# Patient Record
Sex: FEMALE | Race: WHITE
[De-identification: names, ages, dates, MRNs, and addresses within clinical notes are randomized per-mention and may not be internally consistent; named-entity substitution may affect disease eponyms.]

---

## 2017-04-28 NOTE — ED
General Adult HPI





- General


Chief complaint: Syncope


Stated complaint: Fall, head injury


Time Seen by Provider: 04/28/17 07:28


Source: patient, RN notes reviewed, old records reviewed


Mode of arrival: wheelchair


Limitations: no limitations





- History of Present Illness


Initial comments: 





This is a 31-year-old female ER for evaluation of syncopal event.  Patient has 

been fall after passing out.  Patient has history of syncope, denies any 

complaints at this time, no headache no chest finishers of breath about pain, 

no injuries from fall.  Patient states she is feeling fine.  Does not smoke, is 

not on birth control.  Has no abdominal pain.  Again is no chest pain.  She has 

syncopal event while getting ready for work this morning, has never had this 

issue before, did collapse but has no injury from fall.  Patient's at this time 

she feels fine





- Related Data


 Home Medications











 Medication  Instructions  Recorded  Confirmed


 


buPROPion SR [Wellbutrin Sr] 150 mg PO BID 04/28/17 04/28/17


 


traMADol HCl [Ultram] 50 mg PO BID 04/28/17 04/28/17











 Allergies











Allergy/AdvReac Type Severity Reaction Status Date / Time


 


No Known Allergies Allergy   Verified 04/28/17 07:25














Review of Systems


ROS Statement: 


Those systems with pertinent positive or pertinent negative responses have been 

documented in the HPI.





ROS Other: All systems not noted in ROS Statement are negative.





Past Medical History


Past Medical History: No Reported History


History of Any Multi-Drug Resistant Organisms: None Reported


Past Surgical History: No Surgical Hx Reported


Past Anesthesia/Blood Transfusion Reactions: No Reported Reaction


Past Psychological History: Anxiety, Depression


Smoking Status: Current every day smoker


Past Alcohol Use History: None Reported


Past Drug Use History: None Reported





- Past Family History


  ** Mother


Family Medical History: No Reported History





General Exam


Limitations: no limitations


General appearance: alert, in no apparent distress


Head exam: Present: atraumatic, normocephalic, normal inspection


Eye exam: Present: normal appearance, PERRL, EOMI.  Absent: scleral icterus, 

conjunctival injection, periorbital swelling


ENT exam: Present: normal exam, mucous membranes moist


Neck exam: Present: normal inspection.  Absent: tenderness, meningismus, 

lymphadenopathy


Respiratory exam: Present: normal lung sounds bilaterally.  Absent: respiratory 

distress, wheezes, rales, rhonchi, stridor


Cardiovascular Exam: Present: regular rate, normal rhythm, normal heart sounds.

  Absent: systolic murmur, diastolic murmur, rubs, gallop, clicks


GI/Abdominal exam: Present: soft, normal bowel sounds.  Absent: distended, 

tenderness, guarding, rebound, rigid


Extremities exam: Present: normal inspection, full ROM, normal capillary 

refill.  Absent: tenderness, pedal edema, joint swelling, calf tenderness


Back exam: Present: normal inspection


Neurological exam: Present: alert, oriented X3, CN II-XII intact


Psychiatric exam: Present: normal affect, normal mood


Skin exam: Present: warm, dry, intact, normal color.  Absent: rash





Course


 Vital Signs











  04/28/17 04/28/17





  07:20 08:41


 


Temperature 97.4 F L 98.1 F


 


Pulse Rate 116 H 103 H


 


Respiratory 16 18





Rate  


 


Blood Pressure 132/87 127/64


 


O2 Sat by Pulse 97 99





Oximetry  














EKG Findings





- EKG Comments:


EKG Findings:: EKG shows sinus tachycardia rate 104, , QRS 84, 





Medical Decision Making





- Medical Decision Making





31 female to the ER for evaluation of syncope, patient patient has recurrent 

syncope, at this time has no complaints.  Patient states she feels fine and 

would like to be discharged home





- Lab Data


 Lab Results











  04/28/17 04/28/17 04/28/17 Range/Units





  07:33 07:48 07:48 


 


POC Glucose (mg/dL)  89    (75-99)  mg/dL


 


POC Glu Operater ID  Tomas Ventura    


 


Urine Color   Yellow   


 


Urine Appearance   Clear   (Clear)  


 


Urine pH   5.5   (5.0-8.0)  


 


Ur Specific Gravity   1.012   (1.001-1.035)  


 


Urine Protein   Negative   (Negative)  


 


Urine Glucose (UA)   Negative   (Negative)  


 


Urine Ketones   Negative   (Negative)  


 


Urine Blood   Small H   (Negative)  


 


Urine Nitrite   Negative   (Negative)  


 


Urine Bilirubin   Negative   (Negative)  


 


Urine Urobilinogen   <2.0   (<2.0)  mg/dL


 


Ur Leukocyte Esterase   Negative   (Negative)  


 


Urine RBC   1   (0-5)  /hpf


 


Urine WBC   3   (0-5)  /hpf


 


Ur Squamous Epith Cells   <1   (0-4)  /hpf


 


Hyaline Casts   13 H   (0-2)  /lpf


 


Granular Casts   33   (0)  /lpf


 


Urine Mucus   Rare H   (None)  /hpf


 


Urine HCG, Qual    Not Detected  (Not Detectd)  














Disposition


Clinical Impression: 


 Vasovagal syncope





Disposition: HOME SELF-CARE


Condition: Good


Instructions:  Syncope (ED)


Referrals: 


Nena Raymundo MD [Primary Care Provider] - 1-2 days

## 2018-05-18 ENCOUNTER — HOSPITAL ENCOUNTER (OUTPATIENT)
Dept: HOSPITAL 47 - EC | Age: 33
Setting detail: OBSERVATION
LOS: 2 days | Discharge: HOME | End: 2018-05-20
Payer: COMMERCIAL

## 2018-05-18 VITALS — BODY MASS INDEX: 32.2 KG/M2

## 2018-05-18 DIAGNOSIS — R55: Primary | ICD-10-CM

## 2018-05-18 DIAGNOSIS — S09.90XA: ICD-10-CM

## 2018-05-18 DIAGNOSIS — F15.10: ICD-10-CM

## 2018-05-18 DIAGNOSIS — F17.200: ICD-10-CM

## 2018-05-18 DIAGNOSIS — S30.0XXA: ICD-10-CM

## 2018-05-18 DIAGNOSIS — M41.9: ICD-10-CM

## 2018-05-18 DIAGNOSIS — N39.0: ICD-10-CM
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
## 2018-05-18 DIAGNOSIS — T42.6X1A: ICD-10-CM

## 2018-05-18 DIAGNOSIS — E66.9: ICD-10-CM
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
## 2018-05-18 DIAGNOSIS — F41.9: ICD-10-CM

## 2018-05-18 DIAGNOSIS — S00.531A: ICD-10-CM

## 2018-05-18 DIAGNOSIS — F11.10: ICD-10-CM

## 2018-05-18 DIAGNOSIS — R00.0: ICD-10-CM

## 2018-05-18 DIAGNOSIS — R56.9: ICD-10-CM

## 2018-05-18 DIAGNOSIS — F32.9: ICD-10-CM

## 2018-05-18 LAB
ALBUMIN SERPL-MCNC: 3.8 G/DL (ref 3.5–5)
ALP SERPL-CCNC: 66 U/L (ref 38–126)
ALT SERPL-CCNC: 39 U/L (ref 9–52)
ANION GAP SERPL CALC-SCNC: 14 MMOL/L
APTT BLD: 23 SEC (ref 22–30)
AST SERPL-CCNC: 23 U/L (ref 14–36)
BASOPHILS # BLD AUTO: 0 K/UL (ref 0–0.2)
BASOPHILS NFR BLD AUTO: 0 %
BUN SERPL-SCNC: 11 MG/DL (ref 7–17)
CALCIUM SPEC-MCNC: 9.4 MG/DL (ref 8.4–10.2)
CHLORIDE SERPL-SCNC: 104 MMOL/L (ref 98–107)
CK SERPL-CCNC: 109 U/L (ref 30–135)
CO2 SERPL-SCNC: 24 MMOL/L (ref 22–30)
EOSINOPHIL # BLD AUTO: 0.2 K/UL (ref 0–0.7)
EOSINOPHIL NFR BLD AUTO: 4 %
ERYTHROCYTE [DISTWIDTH] IN BLOOD BY AUTOMATED COUNT: 3.84 M/UL (ref 3.8–5.4)
ERYTHROCYTE [DISTWIDTH] IN BLOOD: 12.8 % (ref 11.5–15.5)
GLUCOSE SERPL-MCNC: 89 MG/DL (ref 74–99)
HCT VFR BLD AUTO: 38.3 % (ref 34–46)
HGB BLD-MCNC: 12.6 GM/DL (ref 11.4–16)
HYALINE CASTS UR QL AUTO: 33 /LPF (ref 0–2)
INR PPP: 1 (ref ?–1.2)
LYMPHOCYTES # SPEC AUTO: 0.9 K/UL (ref 1–4.8)
LYMPHOCYTES NFR SPEC AUTO: 14 %
MCH RBC QN AUTO: 32.7 PG (ref 25–35)
MCHC RBC AUTO-ENTMCNC: 32.8 G/DL (ref 31–37)
MCV RBC AUTO: 99.9 FL (ref 80–100)
MONOCYTES # BLD AUTO: 0.4 K/UL (ref 0–1)
MONOCYTES NFR BLD AUTO: 6 %
NEUTROPHILS # BLD AUTO: 4.8 K/UL (ref 1.3–7.7)
NEUTROPHILS NFR BLD AUTO: 72 %
PH UR: 5.5 [PH] (ref 5–8)
PLATELET # BLD AUTO: 288 K/UL (ref 150–450)
POTASSIUM SERPL-SCNC: 4.3 MMOL/L (ref 3.5–5.1)
PROT SERPL-MCNC: 6.4 G/DL (ref 6.3–8.2)
PROT UR QL: (no result)
PT BLD: 10 SEC (ref 9–12)
RBC UR QL: 1 /HPF (ref 0–5)
SODIUM SERPL-SCNC: 142 MMOL/L (ref 137–145)
SP GR UR: 1.02 (ref 1–1.03)
SQUAMOUS UR QL AUTO: <1 /HPF (ref 0–4)
TROPONIN I SERPL-MCNC: <0.012 NG/ML (ref 0–0.03)
UROBILINOGEN UR QL STRIP: 2 MG/DL (ref ?–2)
WBC # BLD AUTO: 6.6 K/UL (ref 3.8–10.6)
WBC #/AREA URNS HPF: 14 /HPF (ref 0–5)

## 2018-05-18 PROCEDURE — 80306 DRUG TEST PRSMV INSTRMNT: CPT

## 2018-05-18 PROCEDURE — 85025 COMPLETE CBC W/AUTO DIFF WBC: CPT

## 2018-05-18 PROCEDURE — 85730 THROMBOPLASTIN TIME PARTIAL: CPT

## 2018-05-18 PROCEDURE — 80053 COMPREHEN METABOLIC PANEL: CPT

## 2018-05-18 PROCEDURE — 96360 HYDRATION IV INFUSION INIT: CPT

## 2018-05-18 PROCEDURE — 81001 URINALYSIS AUTO W/SCOPE: CPT

## 2018-05-18 PROCEDURE — 93005 ELECTROCARDIOGRAM TRACING: CPT

## 2018-05-18 PROCEDURE — 96372 THER/PROPH/DIAG INJ SC/IM: CPT

## 2018-05-18 PROCEDURE — 84484 ASSAY OF TROPONIN QUANT: CPT

## 2018-05-18 PROCEDURE — 70450 CT HEAD/BRAIN W/O DYE: CPT

## 2018-05-18 PROCEDURE — 72220 X-RAY EXAM SACRUM TAILBONE: CPT

## 2018-05-18 PROCEDURE — 82550 ASSAY OF CK (CPK): CPT

## 2018-05-18 PROCEDURE — 84703 CHORIONIC GONADOTROPIN ASSAY: CPT

## 2018-05-18 PROCEDURE — 96375 TX/PRO/DX INJ NEW DRUG ADDON: CPT

## 2018-05-18 PROCEDURE — 82553 CREATINE MB FRACTION: CPT

## 2018-05-18 PROCEDURE — 96376 TX/PRO/DX INJ SAME DRUG ADON: CPT

## 2018-05-18 PROCEDURE — 36415 COLL VENOUS BLD VENIPUNCTURE: CPT

## 2018-05-18 PROCEDURE — 80320 DRUG SCREEN QUANTALCOHOLS: CPT

## 2018-05-18 PROCEDURE — 96361 HYDRATE IV INFUSION ADD-ON: CPT

## 2018-05-18 PROCEDURE — 99285 EMERGENCY DEPT VISIT HI MDM: CPT

## 2018-05-18 PROCEDURE — 85610 PROTHROMBIN TIME: CPT

## 2018-05-18 PROCEDURE — 96374 THER/PROPH/DIAG INJ IV PUSH: CPT

## 2018-05-18 PROCEDURE — 94760 N-INVAS EAR/PLS OXIMETRY 1: CPT

## 2018-05-18 RX ADMIN — CEFAZOLIN STA: 330 INJECTION, POWDER, FOR SOLUTION INTRAMUSCULAR; INTRAVENOUS at 20:16

## 2018-05-18 RX ADMIN — CEFAZOLIN STA MLS/HR: 330 INJECTION, POWDER, FOR SOLUTION INTRAMUSCULAR; INTRAVENOUS at 21:02

## 2018-05-18 NOTE — ED
Medical Decision Making





- Medical Decision Making





Prior to discharge, the patient had approximately 2 seizures well nourished.  

The second one was witnessed by the nurse and was tonic-clonic in nature and 

lasted approximately 30 seconds.  She denies ever having any previous seizures.

  At this point, it is felt as though she would benefit from admission to the 

hospital for further treatment.  She is agreeable.  The case will be discussed 

with internal medicine shortly and neurology consult will be placed.  She will 

also be started on some Keppra.





- Lab Data


Result diagrams: 


 05/18/18 17:15





 05/18/18 17:15


 Lab Results











  05/18/18 05/18/18 05/18/18 Range/Units





  17:15 17:15 17:15 


 


WBC    6.6  (3.8-10.6)  k/uL


 


RBC    3.84  (3.80-5.40)  m/uL


 


Hgb    12.6  (11.4-16.0)  gm/dL


 


Hct    38.3  (34.0-46.0)  %


 


MCV    99.9  (80.0-100.0)  fL


 


MCH    32.7  (25.0-35.0)  pg


 


MCHC    32.8  (31.0-37.0)  g/dL


 


RDW    12.8  (11.5-15.5)  %


 


Plt Count    288  (150-450)  k/uL


 


Neutrophils %    72  %


 


Lymphocytes %    14  %


 


Monocytes %    6  %


 


Eosinophils %    4  %


 


Basophils %    0  %


 


Neutrophils #    4.8  (1.3-7.7)  k/uL


 


Lymphocytes #    0.9 L  (1.0-4.8)  k/uL


 


Monocytes #    0.4  (0-1.0)  k/uL


 


Eosinophils #    0.2  (0-0.7)  k/uL


 


Basophils #    0.0  (0-0.2)  k/uL


 


PT     (9.0-12.0)  sec


 


INR     (<1.2)  


 


APTT     (22.0-30.0)  sec


 


Sodium  142    (137-145)  mmol/L


 


Potassium  4.3    (3.5-5.1)  mmol/L


 


Chloride  104    ()  mmol/L


 


Carbon Dioxide  24    (22-30)  mmol/L


 


Anion Gap  14    mmol/L


 


BUN  11    (7-17)  mg/dL


 


Creatinine  0.70    (0.52-1.04)  mg/dL


 


Est GFR (CKD-EPI)AfAm  >90    (>60 ml/min/1.73 sqM)  


 


Est GFR (CKD-EPI)NonAf  >90    (>60 ml/min/1.73 sqM)  


 


Glucose  89    (74-99)  mg/dL


 


Calcium  9.4    (8.4-10.2)  mg/dL


 


Total Bilirubin  0.2    (0.2-1.3)  mg/dL


 


AST  23    (14-36)  U/L


 


ALT  39    (9-52)  U/L


 


Alkaline Phosphatase  66    ()  U/L


 


Total Creatine Kinase   109   ()  U/L


 


CK-MB (CK-2)   0.5   (0.0-2.4)  ng/mL


 


CK-MB (CK-2) Rel Index   0.5   


 


Troponin I   <0.012   (0.000-0.034)  ng/mL


 


Total Protein  6.4    (6.3-8.2)  g/dL


 


Albumin  3.8    (3.5-5.0)  g/dL


 


HCG, Qual  Not Detected    


 


Urine Color     


 


Urine Appearance     (Clear)  


 


Urine pH     (5.0-8.0)  


 


Ur Specific Gravity     (1.001-1.035)  


 


Urine Protein     (Negative)  


 


Urine Glucose (UA)     (Negative)  


 


Urine Ketones     (Negative)  


 


Urine Blood     (Negative)  


 


Urine Nitrite     (Negative)  


 


Urine Bilirubin     (Negative)  


 


Urine Urobilinogen     (<2.0)  mg/dL


 


Ur Leukocyte Esterase     (Negative)  


 


Urine RBC     (0-5)  /hpf


 


Urine WBC     (0-5)  /hpf


 


Ur Squamous Epith Cells     (0-4)  /hpf


 


Amorphous Sediment     (None)  /hpf


 


Hyaline Casts     (0-2)  /lpf


 


Urine Mucus     (None)  /hpf


 


Urine Opiates Screen     (NotDetected)  


 


Ur Oxycodone Screen     (NotDetected)  


 


Urine Methadone Screen     (NotDetected)  


 


Ur Propoxyphene Screen     (NotDetected)  


 


Ur Barbiturates Screen     (NotDetected)  


 


U Tricyclic Antidepress     (NotDetected)  


 


Ur Phencyclidine Scrn     (NotDetected)  


 


Ur Amphetamines Screen     (NotDetected)  


 


U Methamphetamines Scrn     (NotDetected)  


 


U Benzodiazepines Scrn     (NotDetected)  


 


Urine Cocaine Screen     (NotDetected)  


 


U Marijuana (THC) Screen     (NotDetected)  


 


Serum Alcohol  <10    mg/dL














  05/18/18 05/18/18 05/18/18 Range/Units





  17:15 17:15 17:15 


 


WBC     (3.8-10.6)  k/uL


 


RBC     (3.80-5.40)  m/uL


 


Hgb     (11.4-16.0)  gm/dL


 


Hct     (34.0-46.0)  %


 


MCV     (80.0-100.0)  fL


 


MCH     (25.0-35.0)  pg


 


MCHC     (31.0-37.0)  g/dL


 


RDW     (11.5-15.5)  %


 


Plt Count     (150-450)  k/uL


 


Neutrophils %     %


 


Lymphocytes %     %


 


Monocytes %     %


 


Eosinophils %     %


 


Basophils %     %


 


Neutrophils #     (1.3-7.7)  k/uL


 


Lymphocytes #     (1.0-4.8)  k/uL


 


Monocytes #     (0-1.0)  k/uL


 


Eosinophils #     (0-0.7)  k/uL


 


Basophils #     (0-0.2)  k/uL


 


PT  10.0    (9.0-12.0)  sec


 


INR  1.0    (<1.2)  


 


APTT  23.0    (22.0-30.0)  sec


 


Sodium     (137-145)  mmol/L


 


Potassium     (3.5-5.1)  mmol/L


 


Chloride     ()  mmol/L


 


Carbon Dioxide     (22-30)  mmol/L


 


Anion Gap     mmol/L


 


BUN     (7-17)  mg/dL


 


Creatinine     (0.52-1.04)  mg/dL


 


Est GFR (CKD-EPI)AfAm     (>60 ml/min/1.73 sqM)  


 


Est GFR (CKD-EPI)NonAf     (>60 ml/min/1.73 sqM)  


 


Glucose     (74-99)  mg/dL


 


Calcium     (8.4-10.2)  mg/dL


 


Total Bilirubin     (0.2-1.3)  mg/dL


 


AST     (14-36)  U/L


 


ALT     (9-52)  U/L


 


Alkaline Phosphatase     ()  U/L


 


Total Creatine Kinase     ()  U/L


 


CK-MB (CK-2)     (0.0-2.4)  ng/mL


 


CK-MB (CK-2) Rel Index     


 


Troponin I     (0.000-0.034)  ng/mL


 


Total Protein     (6.3-8.2)  g/dL


 


Albumin     (3.5-5.0)  g/dL


 


HCG, Qual     


 


Urine Color   Yellow   


 


Urine Appearance   Clear   (Clear)  


 


Urine pH   5.5   (5.0-8.0)  


 


Ur Specific Gravity   1.024   (1.001-1.035)  


 


Urine Protein   1+ H   (Negative)  


 


Urine Glucose (UA)   Negative   (Negative)  


 


Urine Ketones   Trace H   (Negative)  


 


Urine Blood   Negative   (Negative)  


 


Urine Nitrite   Negative   (Negative)  


 


Urine Bilirubin   Negative   (Negative)  


 


Urine Urobilinogen   2.0   (<2.0)  mg/dL


 


Ur Leukocyte Esterase   Small H   (Negative)  


 


Urine RBC   1   (0-5)  /hpf


 


Urine WBC   14 H   (0-5)  /hpf


 


Ur Squamous Epith Cells   <1   (0-4)  /hpf


 


Amorphous Sediment   Occasional H   (None)  /hpf


 


Hyaline Casts   33 H   (0-2)  /lpf


 


Urine Mucus   Moderate H   (None)  /hpf


 


Urine Opiates Screen    Not Detected  (NotDetected)  


 


Ur Oxycodone Screen    Not Detected  (NotDetected)  


 


Urine Methadone Screen    Detected H  (NotDetected)  


 


Ur Propoxyphene Screen    Not Detected  (NotDetected)  


 


Ur Barbiturates Screen    Not Detected  (NotDetected)  


 


U Tricyclic Antidepress    Not Detected  (NotDetected)  


 


Ur Phencyclidine Scrn    Not Detected  (NotDetected)  


 


Ur Amphetamines Screen    Detected H  (NotDetected)  


 


U Methamphetamines Scrn    Detected H  (NotDetected)  


 


U Benzodiazepines Scrn    Not Detected  (NotDetected)  


 


Urine Cocaine Screen    Not Detected  (NotDetected)  


 


U Marijuana (THC) Screen    Not Detected  (NotDetected)  


 


Serum Alcohol     mg/dL














Disposition


Clinical Impression: 


 Syncope, Accidental drug overdose, Head injury, Coccygeal contusion, Substance 

abuse, New onset seizure, Sinus tachycardia





Disposition: HOME SELF-CARE


Condition: Good


Additional Instructions: 


Please use Tylenol or ibuprofen as needed for pain.


Is patient prescribed a controlled substance at d/c from ED?: No


Referrals: 


Nena Raymundo MD [Primary Care Provider] - 1-2 days


Time of Disposition: 20:56


Decision Date: 05/18/18


Decision Time: 20:56

## 2018-05-18 NOTE — CT
EXAMINATION TYPE: CT brain wo con

 

DATE OF EXAM: 5/18/2018

 

COMPARISON: NONE

 

HISTORY: 32-year-old female complains of multiple syncopal episodes.

 

TECHNIQUE:  Examination was done in axial plane without intravenous contrast.  Coronal and sagittal r
econstructions performed.

 

CT DLP: 915 mGycm

Automated exposure control for dose reduction was used.

 

FINDINGS:

There is no evidence of  acute intracranial hemorrhage, acute ischemic changes, mass, mass-effect, or
 extra-axial fluid collection.  There is no effacement of cerebral sulci or basal subarachnoid cister
ns.  There is no hydrocephalus.  There is no midline shift.  Gray-white matter distinction is preserv
ed.

 

Patient's gaze is divergent suggesting underlying strabismus. Mucosal thickening in the inferior ethm
oid air cells. Mastoid air cells well pneumatized. No calvarial fracture.

 

 

IMPRESSION:

No acute intracranial abnormality seen.

## 2018-05-18 NOTE — XR
EXAMINATION TYPE: XR sacrum coccyx

 

DATE OF EXAM: 5/18/2018

 

COMPARISON: NONE

 

HISTORY: 32-year-old female with pain after fall

 

TECHNIQUE: 3 views

 

FINDINGS: 

SI joints appear intact. Arcuate lines of the sacrum are smooth and continuous. Pubic symphysis intac
t. No angulated or displaced sacral or coccygeal fracture on the lateral view.

 

 

IMPRESSION: 

No angulated or displaced tailbone fracture.

## 2018-05-18 NOTE — ED
Syncope HPI





- General


Chief Complaint: Syncope


Stated Complaint: Syncope


Time Seen by Provider: 05/18/18 17:45


Source: patient, EMS


Mode of arrival: EMS


Limitations: no limitations





- History of Present Illness


Initial Comments: 





This 32-year-old white female presents with a complaint of a syncopal episode.  

She apparently was at home when this occurred.  She relates that she thinks 

that she has fibromyalgia and took 2 unknown milligram pills of Lyrica around 

noon today.  She then started feeling somewhat dizzy and like she was drunk 

around 2 PM.  She apparently passed out and hit her head at that time.  She 

presents with some swelling to her forehead and a contusion to her left lower 

lip.  She also is complaining of some pain to her tailbone.  She states that 

she is feeling less dizzy at this time.  She is unsure how long she was 

unconscious for.  She does complain of a slight headache.  She relates that 

this is the first time that she took the Lyrica and it was from a friend.  She 

is counseled not to take any medications she is not prescribed.  She denies any 

neck or back pain.  No extremity pain.  No other complaints or modifying 

factors.





- Related Data


 Home Medications











 Medication  Instructions  Recorded  Confirmed


 


No Known Home Medications [No  05/18/18 05/18/18





Known Home Medications]   











 Allergies











Allergy/AdvReac Type Severity Reaction Status Date / Time


 


No Known Allergies Allergy   Verified 05/18/18 18:00














Review of Systems


ROS Statement: 


Those systems with pertinent positive or pertinent negative responses have been 

documented in the HPI.





ROS Other: All systems not noted in ROS Statement are negative.





Past Medical History


Past Medical History: No Reported History


History of Any Multi-Drug Resistant Organisms: None Reported


Past Surgical History: No Surgical Hx Reported


Past Anesthesia/Blood Transfusion Reactions: No Reported Reaction


Past Psychological History: Anxiety, Depression


Smoking Status: Current every day smoker


Past Alcohol Use History: None Reported


Past Drug Use History: None Reported, Methamphetamine





- Past Family History


  ** Mother


Family Medical History: No Reported History





General Exam





- General Exam Comments


Initial Comments: 





GENERAL: The patient is well nourished and well hydrated. 


VITAL SIGNS: Heart rate, blood pressure, respiratory rate reviewed as recorded 

in nurse's notes. 


EYES: Pupils are round and reactive. Extraocular movements are intact. No 

conjunctival / lid redness or swelling. 


ENT: There is some mild tenderness and swelling to the forehead region just to 

the left of midline superiorly.  There is some swelling and ecchymosis present 

to the left lower lip.  Dentition is intact and nontender.  Airway is patent. 

Throat is clear. 


NECK: Nontender. No swelling or evidence of injury. No subcutaneous emphysema. 

Trachea is midline. No thyroid mass. 


HEART: Regular rate and rhythm. Good peripheral pulses. 


LUNGS/CHEST: Breath sounds clear and equal bilaterally. No rales, rhonchi, or 

wheezes. No ecchymosis, subcutaneous emphysema, or tenderness. 


ABDOMEN: Abdomen soft without tenderness. No palpable masses or organomegaly. 

No peritoneal signs. No abdominal wall swelling or ecchymosis. 


EXTREMITIES: No extremity tenderness. Normal muscle tone and function. No 

thoracolumbar tenderness.  There is some mild tenderness over the sacrum and 

coccyx.


NEUROLOGIC: Sensation is grossly intact. Cranial nerve exam reveals face is 

symmetrical, tongue is midline, speech is clear. 


SKIN: No abrasions or ecchymosis is noted. No induration or masses noted. 


PSYCHIATRIC: Alert and oriented. Appropriate behavior and judgment.





Limitations: no limitations





Course


 Vital Signs











  05/18/18 05/18/18





  17:30 19:15


 


Temperature 98.3 F 99.3 F


 


Pulse Rate 123 H 106 H


 


Respiratory 20 16





Rate  


 


Blood Pressure 119/57 121/68


 


O2 Sat by Pulse 100 100





Oximetry  














Medical Decision Making





- Medical Decision Making





The patient was seen and examined.  All diagnostics were reviewed.  IV 

hydration is initiated.  The EKG shows a sinus tachycardia rate of 111.  There 

is no acute ST-T wave changes identified.  The AR intervals 140, QRS duration 

is 70, and the QTC intervals 467.  The laboratory was all essentially within 

normal limits.  The urinalysis did show the possibility of a urinary infection 

or patient is completely asymptomatic in regards to frequency, urgency, dysuria

, and hematuria.  Overall, it is felt as though the urinary findings are 

equivocal and she will not be treated for urinary tract infection at this time.

  The computed tomography scan of the brain did not show any acute processes.  

The sacrum and coccyx x-ray is negative for any fracture.  She is doing well on 

recheck.  The urinalysis drug screen shows positive for methadone, amphetamines

, and dextroamphetamines.  She is questioned in this regard and states that she 

does not take any of these medications.  She is wondering if this could be a 

cross reactive reaction to taking Wellbutrin.  It is felt that it would not be 

likely but potentially possible further amphetamines and dextroamphetamines.  

It would not show positive for methadone.  She was questioned several different 

ways and still refuses taking these medications.  Nevertheless, it is felt as 

though she stable for discharge.  She is counseled once again about avoiding 

drugs from other people and avoiding any amphetamines or methadone.





- Lab Data


Result diagrams: 


 05/18/18 17:15





 05/18/18 17:15


 Lab Results











  05/18/18 05/18/18 05/18/18 Range/Units





  17:15 17:15 17:15 


 


WBC    6.6  (3.8-10.6)  k/uL


 


RBC    3.84  (3.80-5.40)  m/uL


 


Hgb    12.6  (11.4-16.0)  gm/dL


 


Hct    38.3  (34.0-46.0)  %


 


MCV    99.9  (80.0-100.0)  fL


 


MCH    32.7  (25.0-35.0)  pg


 


MCHC    32.8  (31.0-37.0)  g/dL


 


RDW    12.8  (11.5-15.5)  %


 


Plt Count    288  (150-450)  k/uL


 


Neutrophils %    72  %


 


Lymphocytes %    14  %


 


Monocytes %    6  %


 


Eosinophils %    4  %


 


Basophils %    0  %


 


Neutrophils #    4.8  (1.3-7.7)  k/uL


 


Lymphocytes #    0.9 L  (1.0-4.8)  k/uL


 


Monocytes #    0.4  (0-1.0)  k/uL


 


Eosinophils #    0.2  (0-0.7)  k/uL


 


Basophils #    0.0  (0-0.2)  k/uL


 


PT     (9.0-12.0)  sec


 


INR     (<1.2)  


 


APTT     (22.0-30.0)  sec


 


Sodium  142    (137-145)  mmol/L


 


Potassium  4.3    (3.5-5.1)  mmol/L


 


Chloride  104    ()  mmol/L


 


Carbon Dioxide  24    (22-30)  mmol/L


 


Anion Gap  14    mmol/L


 


BUN  11    (7-17)  mg/dL


 


Creatinine  0.70    (0.52-1.04)  mg/dL


 


Est GFR (CKD-EPI)AfAm  >90    (>60 ml/min/1.73 sqM)  


 


Est GFR (CKD-EPI)NonAf  >90    (>60 ml/min/1.73 sqM)  


 


Glucose  89    (74-99)  mg/dL


 


Calcium  9.4    (8.4-10.2)  mg/dL


 


Total Bilirubin  0.2    (0.2-1.3)  mg/dL


 


AST  23    (14-36)  U/L


 


ALT  39    (9-52)  U/L


 


Alkaline Phosphatase  66    ()  U/L


 


Total Creatine Kinase   109   ()  U/L


 


CK-MB (CK-2)   0.5   (0.0-2.4)  ng/mL


 


CK-MB (CK-2) Rel Index   0.5   


 


Troponin I   <0.012   (0.000-0.034)  ng/mL


 


Total Protein  6.4    (6.3-8.2)  g/dL


 


Albumin  3.8    (3.5-5.0)  g/dL


 


HCG, Qual  Not Detected    


 


Urine Color     


 


Urine Appearance     (Clear)  


 


Urine pH     (5.0-8.0)  


 


Ur Specific Gravity     (1.001-1.035)  


 


Urine Protein     (Negative)  


 


Urine Glucose (UA)     (Negative)  


 


Urine Ketones     (Negative)  


 


Urine Blood     (Negative)  


 


Urine Nitrite     (Negative)  


 


Urine Bilirubin     (Negative)  


 


Urine Urobilinogen     (<2.0)  mg/dL


 


Ur Leukocyte Esterase     (Negative)  


 


Urine RBC     (0-5)  /hpf


 


Urine WBC     (0-5)  /hpf


 


Ur Squamous Epith Cells     (0-4)  /hpf


 


Amorphous Sediment     (None)  /hpf


 


Hyaline Casts     (0-2)  /lpf


 


Urine Mucus     (None)  /hpf


 


Urine Opiates Screen     (NotDetected)  


 


Ur Oxycodone Screen     (NotDetected)  


 


Urine Methadone Screen     (NotDetected)  


 


Ur Propoxyphene Screen     (NotDetected)  


 


Ur Barbiturates Screen     (NotDetected)  


 


U Tricyclic Antidepress     (NotDetected)  


 


Ur Phencyclidine Scrn     (NotDetected)  


 


Ur Amphetamines Screen     (NotDetected)  


 


U Methamphetamines Scrn     (NotDetected)  


 


U Benzodiazepines Scrn     (NotDetected)  


 


Urine Cocaine Screen     (NotDetected)  


 


U Marijuana (THC) Screen     (NotDetected)  


 


Serum Alcohol  <10    mg/dL














  05/18/18 05/18/18 05/18/18 Range/Units





  17:15 17:15 17:15 


 


WBC     (3.8-10.6)  k/uL


 


RBC     (3.80-5.40)  m/uL


 


Hgb     (11.4-16.0)  gm/dL


 


Hct     (34.0-46.0)  %


 


MCV     (80.0-100.0)  fL


 


MCH     (25.0-35.0)  pg


 


MCHC     (31.0-37.0)  g/dL


 


RDW     (11.5-15.5)  %


 


Plt Count     (150-450)  k/uL


 


Neutrophils %     %


 


Lymphocytes %     %


 


Monocytes %     %


 


Eosinophils %     %


 


Basophils %     %


 


Neutrophils #     (1.3-7.7)  k/uL


 


Lymphocytes #     (1.0-4.8)  k/uL


 


Monocytes #     (0-1.0)  k/uL


 


Eosinophils #     (0-0.7)  k/uL


 


Basophils #     (0-0.2)  k/uL


 


PT  10.0    (9.0-12.0)  sec


 


INR  1.0    (<1.2)  


 


APTT  23.0    (22.0-30.0)  sec


 


Sodium     (137-145)  mmol/L


 


Potassium     (3.5-5.1)  mmol/L


 


Chloride     ()  mmol/L


 


Carbon Dioxide     (22-30)  mmol/L


 


Anion Gap     mmol/L


 


BUN     (7-17)  mg/dL


 


Creatinine     (0.52-1.04)  mg/dL


 


Est GFR (CKD-EPI)AfAm     (>60 ml/min/1.73 sqM)  


 


Est GFR (CKD-EPI)NonAf     (>60 ml/min/1.73 sqM)  


 


Glucose     (74-99)  mg/dL


 


Calcium     (8.4-10.2)  mg/dL


 


Total Bilirubin     (0.2-1.3)  mg/dL


 


AST     (14-36)  U/L


 


ALT     (9-52)  U/L


 


Alkaline Phosphatase     ()  U/L


 


Total Creatine Kinase     ()  U/L


 


CK-MB (CK-2)     (0.0-2.4)  ng/mL


 


CK-MB (CK-2) Rel Index     


 


Troponin I     (0.000-0.034)  ng/mL


 


Total Protein     (6.3-8.2)  g/dL


 


Albumin     (3.5-5.0)  g/dL


 


HCG, Qual     


 


Urine Color   Yellow   


 


Urine Appearance   Clear   (Clear)  


 


Urine pH   5.5   (5.0-8.0)  


 


Ur Specific Gravity   1.024   (1.001-1.035)  


 


Urine Protein   1+ H   (Negative)  


 


Urine Glucose (UA)   Negative   (Negative)  


 


Urine Ketones   Trace H   (Negative)  


 


Urine Blood   Negative   (Negative)  


 


Urine Nitrite   Negative   (Negative)  


 


Urine Bilirubin   Negative   (Negative)  


 


Urine Urobilinogen   2.0   (<2.0)  mg/dL


 


Ur Leukocyte Esterase   Small H   (Negative)  


 


Urine RBC   1   (0-5)  /hpf


 


Urine WBC   14 H   (0-5)  /hpf


 


Ur Squamous Epith Cells   <1   (0-4)  /hpf


 


Amorphous Sediment   Occasional H   (None)  /hpf


 


Hyaline Casts   33 H   (0-2)  /lpf


 


Urine Mucus   Moderate H   (None)  /hpf


 


Urine Opiates Screen    Not Detected  (NotDetected)  


 


Ur Oxycodone Screen    Not Detected  (NotDetected)  


 


Urine Methadone Screen    Detected H  (NotDetected)  


 


Ur Propoxyphene Screen    Not Detected  (NotDetected)  


 


Ur Barbiturates Screen    Not Detected  (NotDetected)  


 


U Tricyclic Antidepress    Not Detected  (NotDetected)  


 


Ur Phencyclidine Scrn    Not Detected  (NotDetected)  


 


Ur Amphetamines Screen    Detected H  (NotDetected)  


 


U Methamphetamines Scrn    Detected H  (NotDetected)  


 


U Benzodiazepines Scrn    Not Detected  (NotDetected)  


 


Urine Cocaine Screen    Not Detected  (NotDetected)  


 


U Marijuana (THC) Screen    Not Detected  (NotDetected)  


 


Serum Alcohol     mg/dL














Disposition


Clinical Impression: 


 Syncope, Accidental drug overdose, Head injury, Coccygeal contusion, Substance 

abuse





Disposition: HOME SELF-CARE


Condition: Good


Instructions:  Coccyx Injury (ED), Head Injury (ED), Facial Contusion (ED), 

Syncope (ED)


Additional Instructions: 


Please use Tylenol or ibuprofen as needed for pain.


Is patient prescribed a controlled substance at d/c from ED?: No


Referrals: 


Nena Raymundo MD [Primary Care Provider] - 1-2 days


Time of Disposition: 19:56

## 2018-05-19 RX ADMIN — ENOXAPARIN SODIUM SCH MG: 40 INJECTION SUBCUTANEOUS at 09:09

## 2018-05-19 RX ADMIN — ACETAMINOPHEN PRN MG: 325 TABLET, FILM COATED ORAL at 18:24

## 2018-05-19 RX ADMIN — CEFTRIAXONE SODIUM SCH MG: 1 INJECTION, POWDER, FOR SOLUTION INTRAMUSCULAR; INTRAVENOUS at 14:45

## 2018-05-19 RX ADMIN — IBUPROFEN PRN MG: 800 TABLET, FILM COATED ORAL at 14:15

## 2018-05-19 RX ADMIN — ACETAMINOPHEN PRN MG: 325 TABLET, FILM COATED ORAL at 09:09

## 2018-05-19 RX ADMIN — NICOTINE SCH PATCH: 21 PATCH, EXTENDED RELEASE TRANSDERMAL at 14:52

## 2018-05-19 NOTE — P.HPIM
History of Present Illness


H&P Date: 05/19/18





Veena Solares is a 32-year-old white female who presented to [facility] emergency room with a complaint of a syncopal episode.  She 

apparently was at home when this occurred.  She relates that she thinks that 

she has fibromyalgia and took 2  pills of Lyrica around noon today that were 

given to her by a friend to help with back pain.  She then started feeling 

somewhat dizzy and like she was drunk around 2 PM.  She apparently passed out 

and hit her head at that time.  She presents with some swelling to her forehead 

and a contusion to her left lower lip.  She also is complaining of some pain to 

her tailbone.  She states that she is feeling less dizzy at this time.  She is 

unsure how long she was unconscious for.  She does complain of a slight 

headache.  She relates that this is the first time that she took the Lyrica and 

it was from a friend.   She denies any neck or back pain.  No extremity pain.  

No other complaints or modifying factors.  Patient denies taking any other 

medications however


Her urine toxicology screen was positive for methadone, amphetamine, and 

methamphetamine.  Patient was admitted to medical floor neurology consultation 

was requested patient was asked about her toxicology screen and she admits 

taking some unknown pills from her friends, she was counseled in length in that 

regard, psychiatry consultation was requested.


Patient also had evidence of tachycardia and a low-grade fever urine analysis 

revealed evidence of urinary tract infection she was started on IV Rocephin 1 g 

every 24 hours.





Past Medical History


Past Medical History: No Reported History


Additional Past Medical History / Comment(s): scoliosis, fractured tailbone,


History of Any Multi-Drug Resistant Organisms: None Reported


Past Surgical History: No Surgical Hx Reported


Past Anesthesia/Blood Transfusion Reactions: No Reported Reaction


Past Psychological History: Anxiety, Depression


Smoking Status: Former smoker


Past Alcohol Use History: None Reported


Past Drug Use History: None Reported, Methamphetamine





- Past Family History


  ** Mother


Family Medical History: No Reported History





Medications and Allergies


 Home Medications











 Medication  Instructions  Recorded  Confirmed  Type


 


No Known Home Medications [No  05/18/18 05/18/18 History





Known Home Medications]    











 Allergies











Allergy/AdvReac Type Severity Reaction Status Date / Time


 


No Known Allergies Allergy   Verified 05/18/18 18:00














Physical Exam


Vitals: 


 Vital Signs











  Temp Pulse Pulse Resp BP BP BP


 


 05/19/18 07:52       


 


 05/19/18 06:21  99.3 F   114 H  20    94/63


 


 05/19/18 00:00    107 H    


 


 05/18/18 23:00  99.3 F   107 H  20   116/73 


 


 05/18/18 22:10  98.9 F  110 H   18  112/62  


 


 05/18/18 21:13  98.4 F  115 H   16  121/71  


 


 05/18/18 20:32  99.9 F H  130 H   22  150/67  


 


 05/18/18 19:15  99.3 F  106 H   16  121/68  


 


 05/18/18 17:30  98.3 F  123 H   20  119/57  














  Pulse Ox


 


 05/19/18 07:52  97


 


 05/19/18 06:21  96


 


 05/19/18 00:00 


 


 05/18/18 23:00  100


 


 05/18/18 22:10  99


 


 05/18/18 21:13  97


 


 05/18/18 20:32  99


 


 05/18/18 19:15  100


 


 05/18/18 17:30  100








 Intake and Output











 05/18/18 05/19/18 05/19/18





 22:59 06:59 14:59


 


Intake Total  100 240


 


Balance  100 240


 


Intake:   


 


  Oral  100 240


 


Other:   


 


  Voiding Method  Bedside Commode Bedside Commode


 


  # Voids  3 2


 


  Weight 90.718 kg 80 kg 80 kg














In general patient is alert and oriented 3 in no apparent distress


HEENT head normocephalic and atraumatic


Neck is supple no JVD no goiter no lymphadenopathy


Chest exam reveals a few scattered rhonchi no wheezing


Cardiac exam reveals regular heart sounds S1 and S2 no gallops no murmurs


Abdomen is soft nontender no organomegaly with normal bowel sounds


Extremity exam reveals no edema no cyanosis or clubbing


Neurological examination reveals no gross focal deficit








Results


CBC & Chem 7: 


 05/18/18 17:15





 05/18/18 17:15


Labs: 


 Abnormal Lab Results - Last 24 Hours (Table)











  05/18/18 05/18/18 05/18/18 Range/Units





  17:15 17:15 17:15 


 


Lymphocytes #  0.9 L    (1.0-4.8)  k/uL


 


Urine Protein   1+ H   (Negative)  


 


Urine Ketones   Trace H   (Negative)  


 


Ur Leukocyte Esterase   Small H   (Negative)  


 


Urine WBC   14 H   (0-5)  /hpf


 


Amorphous Sediment   Occasional H   (None)  /hpf


 


Hyaline Casts   33 H   (0-2)  /lpf


 


Urine Mucus   Moderate H   (None)  /hpf


 


Urine Methadone Screen    Detected H  (NotDetected)  


 


Ur Amphetamines Screen    Detected H  (NotDetected)  


 


U Methamphetamines Scrn    Detected H  (NotDetected)  














Thrombosis Risk Factor Assmnt





- Choose All That Apply


Any of the Below Risk Factors Present?: Yes


Each Factor Represents 1 point: Obesity (BMI >25)


Other Risk Factors: No


Thrombosis Risk Factor Assessment Total Risk Factor Score: 1


Thrombosis Risk Factor Assessment Level: Low Risk





Assessment and Plan


Plan: 





#1 syncopal episode, with fall





#2 head injury





#3 sacral area injury





#4 substance abuse





#5 urinary tract infection








At this time patient is admitted to medical floor she was started on IV fluid, 

IV Rocephin, And on seizure precautions


Neurology consultation and psychiatry consultation were requested


Patient was counseled in length in regards to not take any medication and left 

they were prescribed to her, and not to take any unknown medications from 

friends


Will continue to monitor closely

## 2018-05-20 VITALS
SYSTOLIC BLOOD PRESSURE: 123 MMHG | HEART RATE: 99 BPM | RESPIRATION RATE: 14 BRPM | DIASTOLIC BLOOD PRESSURE: 84 MMHG | TEMPERATURE: 97 F

## 2018-05-20 LAB
ALBUMIN SERPL-MCNC: 3.7 G/DL (ref 3.5–5)
ALP SERPL-CCNC: 59 U/L (ref 38–126)
ALT SERPL-CCNC: 30 U/L (ref 9–52)
ANION GAP SERPL CALC-SCNC: 13 MMOL/L
AST SERPL-CCNC: 15 U/L (ref 14–36)
BASOPHILS # BLD AUTO: 0 K/UL (ref 0–0.2)
BASOPHILS NFR BLD AUTO: 0 %
BUN SERPL-SCNC: 9 MG/DL (ref 7–17)
CALCIUM SPEC-MCNC: 9.4 MG/DL (ref 8.4–10.2)
CHLORIDE SERPL-SCNC: 106 MMOL/L (ref 98–107)
CO2 SERPL-SCNC: 24 MMOL/L (ref 22–30)
EOSINOPHIL # BLD AUTO: 0.1 K/UL (ref 0–0.7)
EOSINOPHIL NFR BLD AUTO: 3 %
ERYTHROCYTE [DISTWIDTH] IN BLOOD BY AUTOMATED COUNT: 3.74 M/UL (ref 3.8–5.4)
ERYTHROCYTE [DISTWIDTH] IN BLOOD: 12.8 % (ref 11.5–15.5)
GLUCOSE SERPL-MCNC: 130 MG/DL (ref 74–99)
HCT VFR BLD AUTO: 37.8 % (ref 34–46)
HGB BLD-MCNC: 12.3 GM/DL (ref 11.4–16)
LYMPHOCYTES # SPEC AUTO: 0.7 K/UL (ref 1–4.8)
LYMPHOCYTES NFR SPEC AUTO: 16 %
MCH RBC QN AUTO: 32.9 PG (ref 25–35)
MCHC RBC AUTO-ENTMCNC: 32.6 G/DL (ref 31–37)
MCV RBC AUTO: 101 FL (ref 80–100)
MONOCYTES # BLD AUTO: 0.2 K/UL (ref 0–1)
MONOCYTES NFR BLD AUTO: 4 %
NEUTROPHILS # BLD AUTO: 3.3 K/UL (ref 1.3–7.7)
NEUTROPHILS NFR BLD AUTO: 73 %
PLATELET # BLD AUTO: 273 K/UL (ref 150–450)
POTASSIUM SERPL-SCNC: 4.3 MMOL/L (ref 3.5–5.1)
PROT SERPL-MCNC: 6.3 G/DL (ref 6.3–8.2)
SODIUM SERPL-SCNC: 143 MMOL/L (ref 137–145)
WBC # BLD AUTO: 4.5 K/UL (ref 3.8–10.6)

## 2018-05-20 RX ADMIN — IBUPROFEN PRN MG: 800 TABLET, FILM COATED ORAL at 08:30

## 2018-05-20 RX ADMIN — CEFTRIAXONE SODIUM SCH MG: 1 INJECTION, POWDER, FOR SOLUTION INTRAMUSCULAR; INTRAVENOUS at 08:31

## 2018-05-20 RX ADMIN — NICOTINE SCH PATCH: 21 PATCH, EXTENDED RELEASE TRANSDERMAL at 08:30

## 2018-05-20 RX ADMIN — ENOXAPARIN SODIUM SCH MG: 40 INJECTION SUBCUTANEOUS at 08:31

## 2018-05-20 NOTE — P.DS
Providers


Date of admission: 


05/18/18 21:00





Expected date of discharge: 05/20/18


Attending physician: 


Sunita Jones





Consults: 





 





05/18/18 21:01


Consult Physician Urgent 


   Consulting Provider: Brittni Brown


   Consult Reason/Comments: new onset seizures


   Do you want consulting provider notified?: Yes





05/19/18 13:58


Consult Physician Urgent 


   Consulting Provider: Mikhail Ruiz


   Consult Reason/Comments: addiction drug problems


   Do you want consulting provider notified?: Already Contacted











Primary care physician: 


Nena Raymundo





Hospital Course: 











Diagnosis on Discharge








#1 syncopal episode, with fall





#2 head injury, no intracranial abnormality on computed tomography scan





#3 sacral area injury, without evidence of fracture on x-ray





#4 substance abuse, with toxicology screen positive for methadone, amphetamine 

and methamphetamine





#5 urinary tract infection, patient treated with Rocephin during her hospital 

stay she was given a prescription for Ceftin 500 mg twice daily for 5 more days





#6 tobacco abuse, patient was counseled in length to quit smoking, she was 

given a NicoDerm patches during this admission .She was given a prescription 

for NicoDerm 21 g patches changed daily at the time of discharge























Hospital course








Veena Solares is a 32-year-old white female who presented to [facility] emergency room with a complaint of a syncopal episode.  She 

apparently was at home when this occurred.  She relates that she thinks that 

she has fibromyalgia and took 2  pills of Lyrica around noon today that were 

given to her by a friend to help with back pain.  She then started feeling 

somewhat dizzy and like she was drunk around 2 PM.  She apparently passed out 

and hit her head at that time.  She presents with some swelling to her forehead 

and a contusion to her left lower lip.  She also is complaining of some pain to 

her tailbone.  She states that she is feeling less dizzy at this time.  She is 

unsure how long she was unconscious for.  She does complain of a slight 

headache.  She relates that this is the first time that she took the Lyrica and 

it was from a friend.   She denies any neck or back pain.  No extremity pain.  

No other complaints or modifying factors.  Patient denies taking any other 

medications however


Her urine toxicology screen was positive for methadone, amphetamine, and 

methamphetamine.  Patient was admitted to medical floor neurology consultation 

was requested patient was asked about her toxicology screen and she admits 

taking some unknown pills from her friends, she was counseled in length in that 

regard, psychiatry consultation was requested.


Patient also had evidence of tachycardia and a low-grade fever urine analysis 

revealed evidence of urinary tract infection she was started on IV Rocephin 1 g 

every 24 hours.





Patient was admitted to medical floor she was evaluated by neurology, she was 

also seen by psychiatry in regard to substance abuse she was cleared for 

discharge no  inpatient intervention was recommended at this time.


Patient was counseled in length in regards to substance abuse and smoking.  She 

was discharged home on 05/20/2018


She will follow with her primary care physician Dr. Raymundo within 1 week





Patient Condition at Discharge: Good





Plan - Discharge Summary


Discharge Rx Participant: Yes


New Discharge Prescriptions: 


New


   Cefuroxime Axetil [Ceftin] 500 mg PO BID #10 tab


   Nicotine 21Mg/24Hr Patch [Habitrol] 1 patch TRANSDERM DAILY  patch


Discharge Medication List





Cefuroxime Axetil [Ceftin] 500 mg PO BID #10 tab 05/20/18 [Rx]


Nicotine 21Mg/24Hr Patch [Habitrol] 1 patch TRANSDERM DAILY  patch 05/20/18 [Rx]








Follow up Appointment(s)/Referral(s): 


Nena Raymundo MD [Primary Care Provider] - 1-2 days


Patient Instructions/Handouts:  Syncope (DC), Chronic Pain (DC), New-Onset 

Seizure in Adults (DC)


Activity/Diet/Wound Care/Special Instructions: 


Please use Tylenol or ibuprofen as needed for pain.


FOLLOW UP WITH [facility] OUTPATIENT COUNSELING AS NEEDED

## 2018-05-20 NOTE — CONS
CONSULTATION



DATE OF SERVICE/DICTATION:

05/20/2018.



IDENTIFYING DATA:

This patient is a 32-year-old  female who was admitted to the hospital with

complaints of syncope and suspected seizure.



HISTORY OF PRESENT ILLNESS:

The patient states that while at home she took a friend's Lyrica hoping it would help

pain that she presumed was due to fibromyalgia.  She states that she also took pills

from a friend out of a bag that were likely ecstasy.  She states that she thought they

were candy at first.  Subsequent to using any substances she had a syncopal episode.

Her ex-boyfriend and friend are at the bedside.  They state that she did have a

witnessed seizure in the emergency room.  The patient states that she did not attempt

to harm herself by using any of the substances.  She denies having any suicidal

ideation.  She has had symptoms of depression in the past as well as anxiety.  She

resides with her friend who was at bedside and her friend states that the patient does

a good job of caring for children and has no concerns about her returning home.  The

patient denies any history of hypomanic or manic episodes.  She reports having no

homicidal ideation.  She denies having any auditory or visual hallucinations or

specific delusions.  The patient has been seen by internal medicine and neurology.



PAST PSYCHIATRIC HISTORY:

No prior inpatient psychiatric admissions.  No history of suicide attempts.  She states

she has not been placed on psychotropic medications for depressive or anxiety symptoms

in the past.



PAST MEDICAL HISTORY:

She suspects she has fibromyalgia.



ALLERGIES:

No known drug allergies.



CHEMICAL DEPENDENCY HISTORY:

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

not used any in 5 years.  She did attend inpatient chemical dependency treatment for

that and has been clean since.



SOCIAL HISTORY:

The patient does have children.  She resides with her friend.  She was employed up

until recently.  She has a high school education with some further education at [employer].



MENTAL STATUS EXAM:

The patient is an overweight  female.  She is lying in bed.  She is dressed in

hospital attire.  She wears eye glasses.  She is pleasant and cooperative during the

interview.  She reports that her mood has been stable.  She indicates at times she will

be anxious.  She is reporting no suicidal or homicidal ideation, intent, or plan.  She

is reporting no auditory or visual hallucinations or any specific delusions.  She

demonstrates no evidence of psychosis.  She demonstrates no tangential thinking, loose

associations or flight of ideas.  There is no evidence of hypomania or josee.  She is

oriented to person, place, and date.  She demonstrates an appropriately expressive

affect including smiling.  She engages in conversation with her friends at bedside.



IMPRESSION:

1. History of major depressive disorder, anxiety unspecified, methamphetamine use

    disorder, in reported remission.

2. Recent syncopal episode with seizure.



PLAN:

The patient is requesting to receive referral for outpatient counseling.  She has

undergone that activity in the past and has found it beneficial.  She does not feel

that she needs a medication prescribed for depression or anxiety.  Her friends are at

bedside.  They assure me that the patient is doing well in caring for her children.

They have no concerns regarding the children.  They indicate they will oversee her

medication use to the best of their ability.  They do not feel that she is a imminent

safety risk and they are comfortable with her returning home.  The patient does not

feel she needs a referral for inpatient chemical dependency treatment, but again is

willing to discuss her symptoms in an outpatient setting.  The case was discussed with

the patient's nurse.





SYEDA / CLARICE: [id number] / Job#: [id number]